# Patient Record
(demographics unavailable — no encounter records)

---

## 2025-03-21 NOTE — PROCEDURE
[Large Joint Injection] : Large joint injection [Left] : of the left [Subacromial Space] : subacromial space [Pain] : pain [Inflammation] : inflammation [Alcohol] : alcohol [Betadine] : betadine [Ethyl Chloride sprayed topically] : ethyl chloride sprayed topically [Sterile technique used] : sterile technique used [___ cc    6mg] :  Betamethasone (Celestone) ~Vcc of 6mg [___ cc    1%] : Lidocaine ~Vcc of 1%  [___ cc    0.5%] : Bupivacaine (Marcaine) ~Vcc of 0.5%  [] : Patient tolerated procedure well [Call if redness, pain or fever occur] : call if redness, pain or fever occur [Apply ice for 15min out of every hour for the next 12-24 hours as tolerated] : apply ice for 15 minutes out of every hour for the next 12-24 hours as tolerated [Previous OTC use and PT nontherapeutic] : patient has tried OTC's including aspirin, Ibuprofen, Aleve, etc or prescription NSAIDS, and/or exercises at home and/or physical therapy without satisfactory response [Patient had decreased mobility in the joint] : patient had decreased mobility in the joint [Risks, benefits, alternatives discussed / Verbal consent obtained] : the risks benefits, and alternatives have been discussed, and verbal consent was obtained [Prior failure or difficult injection] : prior failure or difficult injection [All ultrasound images have been permanently captured and stored accordingly in our picture archiving and communication system] : All ultrasound images have been permanently captured and stored accordingly in our picture archiving and communication system [Visualization of the needle and placement of injection was performed without complication] : visualization of the needle and placement of injection was performed without complication

## 2025-03-22 NOTE — PHYSICAL EXAM
[Left] : left shoulder [Sitting] : sitting [Mild] : mild [] : negative drop-arm test [TWNoteComboBox7] : active forward flexion 170 degrees [de-identified] : active abduction 170 degrees [TWNoteComboBox6] : internal rotation 60 degrees [de-identified] : external rotation 75 degrees

## 2025-03-22 NOTE — DISCUSSION/SUMMARY
[de-identified] : General Dx discussion The patient was advised of the diagnosis. The natural history of the pathology was explained in full to the patient in layman's terms. All questions were answered. The risks and benefits of surgical and non-surgical treatment alternatives were explained in full to the patient.   Case discussed. Cortisone injection today tolerated well. Ice as needed. He will be sent for a course of PT if pain persists. Will start mobic 15mg QD. I advised MERCEDES that the NSAID should be taken with food.  In addition while taking the prescribed NSAID, no over the counter or other NSAIDs should be used, such as ibuprofen (Motrin or Advil) or naproxen (Aleve) as this can cause stomach upset or other side effects.  If needed for fever or breakthrough pain Tylenol can be used. Follow up in 6 weeks.     Entered by CLIVE Coronado acting as scribe. - The documentation recorded by the scribe accurately reflects the service I personally performed and the decisions made by me.

## 2025-03-22 NOTE — DISCUSSION/SUMMARY
[de-identified] : General Dx discussion The patient was advised of the diagnosis. The natural history of the pathology was explained in full to the patient in layman's terms. All questions were answered. The risks and benefits of surgical and non-surgical treatment alternatives were explained in full to the patient.   Case discussed. Cortisone injection today tolerated well. Ice as needed. He will be sent for a course of PT if pain persists. Will start mobic 15mg QD. I advised MERCEDES that the NSAID should be taken with food.  In addition while taking the prescribed NSAID, no over the counter or other NSAIDs should be used, such as ibuprofen (Motrin or Advil) or naproxen (Aleve) as this can cause stomach upset or other side effects.  If needed for fever or breakthrough pain Tylenol can be used. Follow up in 6 weeks.     Entered by CLIVE Coronado acting as scribe. - The documentation recorded by the scribe accurately reflects the service I personally performed and the decisions made by me.

## 2025-03-22 NOTE — HISTORY OF PRESENT ILLNESS
[7] : 7 [1] : 2 [Sharp] : sharp [Constant] : constant [Leisure] : leisure [Nothing helps with pain getting better] : Nothing helps with pain getting better [Lying in bed] : lying in bed [de-identified] : 03/21/2025: MERCEDES HASTINGS, a 60 year old RHD male, presents today for LT shoulder pain. States that pain has been present for a couple of months now. Pain is waking him at night. Denies injury/recent trauma. Saw his PCP, got a muscle relaxer and had an MRI taken at [facility] on 3/17/25. Denies numbness/ tingling.  [] : no [FreeTextEntry1] : LT shoulder [de-identified] : Davide

## 2025-03-22 NOTE — DATA REVIEWED
[MRI] : MRI [Left] : left [Shoulder] : shoulder [Report was reviewed and noted in the chart] : The report was reviewed and noted in the chart [I reviewed the films/CD] : I reviewed the films/CD [FreeTextEntry1] : Supraspinatus and subscapularis tendinosis with mild intrasubstance delaminating tearing. No evidence of full-thickness rotator cuff tendon tears. Thickening of the joint capsule at the level of the axillary recess. This finding may be secondary to a nonacute injury or the presence of an element of adhesive capsulitis. Unenhanced

## 2025-03-22 NOTE — HISTORY OF PRESENT ILLNESS
[7] : 7 [1] : 2 [Sharp] : sharp [Constant] : constant [Leisure] : leisure [Nothing helps with pain getting better] : Nothing helps with pain getting better [Lying in bed] : lying in bed [de-identified] : 03/21/2025: MERCEDES HASTINGS, a 60 year old RHD male, presents today for LT shoulder pain. States that pain has been present for a couple of months now. Pain is waking him at night. Denies injury/recent trauma. Saw his PCP, got a muscle relaxer and had an MRI taken at [facility] on 3/17/25. Denies numbness/ tingling.  [] : no [FreeTextEntry1] : LT shoulder [de-identified] : Davide

## 2025-03-22 NOTE — PHYSICAL EXAM
[Left] : left shoulder [Sitting] : sitting [Mild] : mild [] : negative drop-arm test [TWNoteComboBox7] : active forward flexion 170 degrees [de-identified] : active abduction 170 degrees [TWNoteComboBox6] : internal rotation 60 degrees [de-identified] : external rotation 75 degrees

## 2025-05-02 NOTE — HISTORY OF PRESENT ILLNESS
[7] : 7 [1] : 2 [Sharp] : sharp [Constant] : constant [Leisure] : leisure [Nothing helps with pain getting better] : Nothing helps with pain getting better [Lying in bed] : lying in bed [de-identified] : 05/02/2025: Patient is here for follow up for his left shoulder. States that pain is about the same since last visit. States that he would like an updated PT script today. [] : no [FreeTextEntry1] : LT shoulder [de-identified] : Davide

## 2025-05-02 NOTE — DISCUSSION/SUMMARY
[de-identified] : General Dx discussion The patient was advised of the diagnosis. The natural history of the pathology was explained in full to the patient in layman's terms. All questions were answered. The risks and benefits of surgical and non-surgical treatment alternatives were explained in full to the patient.   Case discussed. will try a medrol dose pack  has not been to PT will go  poss of surgery discussed

## 2025-05-02 NOTE — PHYSICAL EXAM
[Left] : left shoulder [Sitting] : sitting [Mild] : mild [] : negative drop-arm test [TWNoteComboBox7] : active forward flexion 170 degrees [de-identified] : active abduction 170 degrees [TWNoteComboBox6] : internal rotation 60 degrees [de-identified] : external rotation 70 degrees

## 2025-06-06 NOTE — HISTORY OF PRESENT ILLNESS
[de-identified] : 06/05/25: Patient is here for follow up for his left shoulder. Pt has been attending physical therapy 2x a week with benefit. Pt reports still slight pain in Lt shoulder with certain movements but feels stronger. Pain scale 4/10.  05/02/2025: Patient is here for follow up for his left shoulder. States that pain is about the same since last visit. States that he would like an updated PT script today. [] : no [FreeTextEntry1] : LT shoulder [de-identified] : Davide

## 2025-06-06 NOTE — HISTORY OF PRESENT ILLNESS
[de-identified] : 06/05/25: Patient is here for follow up for his left shoulder. Pt has been attending physical therapy 2x a week with benefit. Pt reports still slight pain in Lt shoulder with certain movements but feels stronger. Pain scale 4/10.  05/02/2025: Patient is here for follow up for his left shoulder. States that pain is about the same since last visit. States that he would like an updated PT script today. [] : no [FreeTextEntry1] : LT shoulder [de-identified] : Davide

## 2025-06-06 NOTE — DISCUSSION/SUMMARY
[de-identified] : General Dx discussion The patient was advised of the diagnosis. The natural history of the pathology was explained in full to the patient in layman's terms. All questions were answered. The risks and benefits of surgical and non-surgical treatment alternatives were explained in full to the patient.   Case discussed. Cortisone injection today tolerated well. Ice as needed. Continue HEP. Discussed the possibility of surgery in the future if pain persists. Follow up in 4-6 weeks.   Entered by CLIVE Coronado acting as scribe. - The documentation recorded by the scribe accurately reflects the service I personally performed, and the decisions made by me.

## 2025-06-06 NOTE — PHYSICAL EXAM
[] : negative drop-arm test [TWNoteComboBox7] : active forward flexion 170 degrees [de-identified] : active abduction 170 degrees [TWNoteComboBox6] : internal rotation 60 degrees [de-identified] : external rotation 70 degrees

## 2025-06-06 NOTE — PHYSICAL EXAM
[] : negative drop-arm test [TWNoteComboBox7] : active forward flexion 170 degrees [de-identified] : active abduction 170 degrees [TWNoteComboBox6] : internal rotation 60 degrees [de-identified] : external rotation 70 degrees

## 2025-06-06 NOTE — DISCUSSION/SUMMARY
[de-identified] : General Dx discussion The patient was advised of the diagnosis. The natural history of the pathology was explained in full to the patient in layman's terms. All questions were answered. The risks and benefits of surgical and non-surgical treatment alternatives were explained in full to the patient.   Case discussed. Cortisone injection today tolerated well. Ice as needed. Continue HEP. Discussed the possibility of surgery in the future if pain persists. Follow up in 4-6 weeks.   Entered by CLIVE Coronado acting as scribe. - The documentation recorded by the scribe accurately reflects the service I personally performed, and the decisions made by me.

## 2025-07-25 NOTE — DISCUSSION/SUMMARY
[de-identified] : General Dx discussion The patient was advised of the diagnosis. The natural history of the pathology was explained in full to the patient in layman's terms. All questions were answered. The risks and benefits of surgical and non-surgical treatment alternatives were explained in full to the patient.   Case discussed. Continue HEP. Discussed the possibility of surgery in the future if pain persists. Follow up in 3 months.   Entered by CLIVE Coronado acting as scribe. - The documentation recorded by the scribe accurately reflects the service I personally performed, and the decisions made by me.

## 2025-07-25 NOTE — HISTORY OF PRESENT ILLNESS
RE: Plan of Care    Dear Dr. Carmelina Pearson DO    Thank you for referring Luzma Powerspritesh. The following information reflects my assessment and plan of care.           Plan of Care 10/13/21   Effective from: 10/13/2021  Effective to: 2021    Plan ID: [id number]           Participants     Name Type Comments Contact Info    Carmelina Pearson DO Referring Provider  [phone]    Carmelina Gonzalez, SLP Speech Language Pathologist             Luzma Butcher \"Shanika\" MRN:[id number] (:1983 37 year old F)            Evaluation     Author: Carmelina Gonzalez, SLP Status: Signed Last edited: 10/13/2021 12:00 PM       Speech Therapy Evaluation    Visit Count: 1  Referred by: Carmelina Pearson DO, next visit (if known): TBD  Medical Diagnosis (from order):   Diagnosis Information      Diagnosis    723.1 (ICD-9-CM) - M54.2 (ICD-10-CM) - Neck pain    723.8 (ICD-9-CM) - M54.81 (ICD-10-CM) - Occipital neuralgia, unspecified laterality    729.1 (ICD-9-CM) - M79.18 (ICD-10-CM) - Cervical myofascial pain syndrome    728.85 (ICD-9-CM) - M62.838 (ICD-10-CM) - Cervical paraspinal muscle spasm    729.1 (ICD-9-CM) - M79.18 (ICD-10-CM) - Myofascial muscle pain    333.83 (ICD-9-CM) - G24.3 (ICD-10-CM) - Torticollis, spasmodic    333.83 (ICD-9-CM) - G24.3 (ICD-10-CM) - Cervical dystonia               Treatment Diagnosis: Cognitive Communication Deficit    Date of Onset/Injury: 1 month ago  Precautions: None  Chart reviewed: Relevant co-morbidities, allergies, tests and medications: previous COVID diagnosis; MRI 10/6/2021    Past Medical History:   Diagnosis Date   • History of chicken pox      Past Surgical History:   Procedure Laterality Date   • Cosmetic surgery       Current Outpatient Medications   Medication Sig Dispense Refill   • meclizine (ANTIVERT) 25 MG tablet Take 1-2 tablets by mouth 3 times daily as needed for Dizziness. 20 tablet 0   • Baclofen 5 MG tablet Take 1 tablet by mouth 3 times daily. 90 tablet 0   • amitriptyline (ELAVIL) 50 MG  [Result of repetitive motion] : result of repetitive motion tablet Take 1 tablet by mouth nightly. 30 tablet 0   • acetaminophen (TYLENOL) 325 MG tablet Take 2 tablets by mouth every 6 hours. 30 tablet 0   • ibuprofen (MOTRIN) 600 MG tablet Take 1 tablet by mouth every 6 hours. 30 tablet 0     No current facility-administered medications for this encounter.                                        SUBJECTIVE   Pt arrived to speech therapy by self. Pt reports that she has been having a lot of head pressure. 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 She cannot read off the computer for more than an hour at a time. She is currently on short term disability. Driving and computer work are the most difficult tasks for her right now. She is also having difficulty comprehending conversation. This can get getter with brain rest. Symptoms started about a month ago. She had COVID with minimal symptoms, then developed a headache which developed into head pressure. She is also getting faint when standing up.     Pain: patient reports pain is not an issue/concern    Function:   Limitations (patient reported): remembering, attention, executive function (organization, planning)  Prior Level (patient reported): independent with communication / cognition.    Prior Treatment: no therapies in the past year for current condition. Hospitalization, home health services or skilled nursing facility in the last 30 days: No, per patient.    Social Support/Home Environment: Patient lives with significant other and young children.  Patient has no assistance from family/friends.       Safety:  Do you feel safe at home, work and/or school? yes, per patient  Fall History: (fall/near fall in past 12 months): No    Patient Goals / Concerns: concentration, short term memory     OBJECTIVE   Evaluation of Speech & Language Comprehension & Express   The Repeatable Battery for the Assessment of Neuropsychological Status (RBANS), Form A was administered to gain an understanding of  [Dull/Aching] : dull/aching the patient's cognitive-linguistic skills. Results are as follows:     Description Index Score Percentile Classification   Immediate Memory Indicates the examinee's ability to remember information immediately after it is presented 83 13 Low Average   Visuospatial/  Construction Indicates the examinee's ability to perceive spatial relations and to construct a spatially accurate copy of a drawing 92 30 Average   Language Indicates the examinee's ability to respond verbally to either naming or retrieving learned material 112 79 High Average   Attention Indicates the examinee's capacity to remember and manipulate both visually and orally presented information in short-term memory storage 79 8 [place]   Delayed Memory Indicates the examinee's anterograde memory capacity 83 13 Low Average   Total Scale A total score that is calculated by summing the above five index scores 85 16 Low Average       Initial Treatment   Initial evaluation completed.    Provided education on: external memory strategies, internal memory strategies, dealing with fatigue, attention/concentration     ASSESSMENT   37 year old female presents to speech therapy below prior level of function in cognition. Pt presents with a mild cognitive communication deficit characterized by reduced attention skills, reduced concentration, and impaired short term memory. Pt was presented with the Repeatable Battery for the Assessment of Neuropsychological Status (RBANS), Form A. Pt was scored as having mildly impaired immediate and delayed memory and mild-moderately impaired attention skills. Overall, pt was classified as having a mild impairment. Pt reports that she is not able to complete work tasks at this time due to her deficits. She cannot consistently work on a computer, recall information related to her job, or complete job tasks effectively/correctly. 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 She is motivated to return to work and  [Sharp] : sharp improve her cognitive communication skills. Pt would benefit from skilled ST to target memory and attention skills in order to return to work. Pt was educated on the results of today's evaluation and POC. She demonstrates agreement/understanding. Pt was provided education via handouts on external/internal memory strategies, dealing with fatigue, and attention/concentration.     Patient will benefit from skilled therapy and Rehabilitative potential is good based on assessment above  Plan of care to improve functional communication, improve cognition to address functional limitations listed above.    Goals:    To be obtained by end of this plan of care:  1. Patient will improve focus and attention skills for complex auditory/visual stimuli at 80% for IADL's and to retain new information/new learning.  2. Patient will demonstrate complex language/thought processing skills such as problem solving, reasoning, and higher level executive functioning in order to improve safety and awareness in functional living environment at 80% accuracy.  3. Patient will listen to a short 5 min patient summary/mock case study and answer questions with 80% accuracy.     PLAN   Frequency/Duration: 1 times per week for 8 weeks with tapering as the patient progresses for an estimated total of 8 visits  Treatment of Speech Language (17704)    The plan of care and goals were established with the patient who concurs.  Patient has been given attendance policy at time of initial evaluation.    Patient Education:  Who will be receiving education: patient  Are they ready to learn: yes  Preferred learning style: written, verbal, demonstration  Barriers to learning: no barriers apparent at this time   Result of initial outlined education: Verbalizes understanding and Demonstrates understanding    Therapy procedure time and total treatment time can be found documented on the Time Entry flowsheet         Updated Participants     Name Type Comments  Contact Info    aCrmelina Pearson DO Referring Provider  [phone]    Signature pending    OSORIO Velasquez Speech Language Pathologist      Signature pending            Please complete the attached form to indicate your approval of the plan of care upon receipt and fax signed form to the fax number below.  Insurance compliance requires your approval be on file.  Should you have any questions, feel free to contact me.     OSORIO Velasquez  [facility]  [address] [address] [facility] [address]  Phone: [phone]  Fax: [phone]                RE: Plan of Care for Luzma Butcher, YOB: 1983     I certify the need for these services, furnished under this plan of treatment and while under my care.  I agree with the plan of care as stated and request that therapy proceed.        __________________________________________________________________________________  MD Signature         Date   Time [Constant] : constant [Leisure] : leisure [Nothing helps with pain getting better] : Nothing helps with pain getting better [Lying in bed] : lying in bed [5] : 5 [3] : 3 [Full time] : Work status: full time [de-identified] :  Patient is here for follow up for his left shoulder. Pt reports the injection last month helped a great deal for about 2 weeks. The pain has returned, but is not as intense as before.  Doing home exercises. [] : Post Surgical Visit: no [FreeTextEntry1] : LT shoulder [de-identified] : Davide [de-identified] : MCA

## 2025-07-25 NOTE — PHYSICAL EXAM
[Left] : left shoulder [Sitting] : sitting [Mild] : mild [] : negative drop-arm test [TWNoteComboBox7] : active forward flexion 170 degrees [de-identified] : active abduction 170 degrees [TWNoteComboBox6] : internal rotation 60 degrees [de-identified] : external rotation 70 degrees